# Patient Record
Sex: FEMALE | Race: BLACK OR AFRICAN AMERICAN
[De-identification: names, ages, dates, MRNs, and addresses within clinical notes are randomized per-mention and may not be internally consistent; named-entity substitution may affect disease eponyms.]

---

## 2022-12-13 ENCOUNTER — HOSPITAL ENCOUNTER (INPATIENT)
Dept: HOSPITAL 97 - ER | Age: 22
LOS: 1 days | Discharge: HOME | DRG: 145 | End: 2022-12-14
Attending: OTOLARYNGOLOGY | Admitting: OTOLARYNGOLOGY
Payer: SELF-PAY

## 2022-12-13 VITALS — BODY MASS INDEX: 31.3 KG/M2

## 2022-12-13 DIAGNOSIS — J36: Primary | ICD-10-CM

## 2022-12-13 DIAGNOSIS — Z20.822: ICD-10-CM

## 2022-12-13 LAB
BUN BLD-MCNC: 6 MG/DL (ref 7–18)
GLUCOSE SERPLBLD-MCNC: 108 MG/DL (ref 74–106)
HCT VFR BLD CALC: 38.9 % (ref 36–45)
LYMPHOCYTES # SPEC AUTO: 1.3 K/UL (ref 0.7–4.9)
MCV RBC: 83.7 FL (ref 80–100)
PMV BLD: 8.5 FL (ref 7.6–11.3)
POTASSIUM SERPL-SCNC: 3.2 MMOL/L (ref 3.5–5.1)
RBC # BLD: 4.65 M/UL (ref 3.86–4.86)

## 2022-12-13 PROCEDURE — 99284 EMERGENCY DEPT VISIT MOD MDM: CPT

## 2022-12-13 PROCEDURE — 80048 BASIC METABOLIC PNL TOTAL CA: CPT

## 2022-12-13 PROCEDURE — 96375 TX/PRO/DX INJ NEW DRUG ADDON: CPT

## 2022-12-13 PROCEDURE — 36415 COLL VENOUS BLD VENIPUNCTURE: CPT

## 2022-12-13 PROCEDURE — 87811 SARS-COV-2 COVID19 W/OPTIC: CPT

## 2022-12-13 PROCEDURE — 87040 BLOOD CULTURE FOR BACTERIA: CPT

## 2022-12-13 PROCEDURE — 84703 CHORIONIC GONADOTROPIN ASSAY: CPT

## 2022-12-13 PROCEDURE — 85025 COMPLETE CBC W/AUTO DIFF WBC: CPT

## 2022-12-13 PROCEDURE — 70491 CT SOFT TISSUE NECK W/DYE: CPT

## 2022-12-13 PROCEDURE — 96374 THER/PROPH/DIAG INJ IV PUSH: CPT

## 2022-12-13 RX ADMIN — DEXTROSE AND SODIUM CHLORIDE SCH MLS: 5; .45 INJECTION, SOLUTION INTRAVENOUS at 21:00

## 2022-12-13 RX ADMIN — METHYLPREDNISOLONE SODIUM SUCCINATE SCH MLS: 125 INJECTION, POWDER, FOR SOLUTION INTRAMUSCULAR; INTRAVENOUS at 21:00

## 2022-12-13 NOTE — EDPHYS
Physician Documentation                                                                           

 [facility]                                                                 

Name: Darlyn Lynch                                                                            

Age: 22 yrs                                                                                       

Sex: Female                                                                                       

: 2000                                                                                   

MRN: [id number]                                                                                   

Arrival Date: 2022                                                                          

Time: 16:46                                                                                       

Account#: [id number]                                                                            

Bed 4                                                                                             

Private MD:                                                                                       

ED Physician Albino Beaulieu                                                                         

HPI:                                                                                              

                                                                                             

17:19 This 22 yrs old Black Female presents to ER via Ambulatory with complaints of Sore      snw 

      Throat.                                                                                     

17:19 The patient presents with sore throat. The patient describes throat pain as             snw 

      suffocating, the patient is unable to open their mouth due to pain. Onset: The              

      symptoms/episode began/occurred acutely, 2 week(s) ago, and became persistent. Severity     

      of symptoms: At their worst the symptoms were moderate. Associated signs and symptoms:      

      Pertinent positives: dysphagia, fever, flu-like symptoms, Sore throat. The patient has      

      not experienced similar symptoms in the past. It is unknown whether or not the patient      

      has recently seen a physician.                                                              

                                                                                                  

OB/GYN:                                                                                           

17:13 LMP 2022                                                                          [facility] 

                                                                                                  

Historical:                                                                                       

- Allergies:                                                                                      

17:13 No Known Allergies;                                                                     [facility] 

                                                                                                  

- Immunization history:: Adult Immunizations up to date.                                          

- Social history:: Smoking status: Patient denies any tobacco usage or history of.                

                                                                                                  

                                                                                                  

ROS:                                                                                              

17:18 Constitutional: Negative for fever, chills, and weight loss, Eyes: Negative for injury, snw 

      pain, redness, and discharge, Neck: Negative for injury, pain, and swelling,                

      Cardiovascular: Negative for chest pain, palpitations, and edema, Respiratory: Negative     

      for shortness of breath, cough, wheezing, and pleuritic chest pain, Abdomen/GI:             

      Negative for abdominal pain, nausea, vomiting, diarrhea, and constipation, Back:            

      Negative for injury and pain, : Negative for injury, bleeding, discharge, and             

      swelling, MS/Extremity: Negative for injury and deformity, Skin: Negative for injury,       

      rash, and discoloration, Neuro: Negative for headache, weakness, numbness, tingling,        

      and seizure, Psych: Negative for depression, anxiety, suicide ideation, homicidal           

      ideation, and hallucinations.                                                               

17:18 ENT: Positive for sore throat, diff swallowing x 2 weeks.                                   

                                                                                                  

Exam:                                                                                             

17:17 Constitutional:  This is a well developed, well nourished patient who is awake, alert,  snw 

      and in no acute distress. Head/Face:  Normocephalic, atraumatic. Eyes:  Pupils equal        

      round and reactive to light, extra-ocular motions intact.  Lids and lashes normal.          

      Conjunctiva and sclera are non-icteric and not injected.  Cornea within normal limits.      

      Periorbital areas with no swelling, redness, or edema. Neck:  Trachea midline, no           

      thyromegaly or masses palpated, and no cervical lymphadenopathy.  Supple, full range of     

      motion without nuchal rigidity, or vertebral point tenderness.  No Meningismus.             

      Chest/axilla:  Normal chest wall appearance and motion.  Nontender with no deformity.       

      No lesions are appreciated. Respiratory:  Lungs have equal breath sounds bilaterally,       

      clear to auscultation and percussion.  No rales, rhonchi or wheezes noted.  No              

      increased work of breathing, no retractions or nasal flaring. Abdomen/GI:  Soft,            

      non-tender, with normal bowel sounds.  No distension or tympany.  No guarding or            

      rebound.  No evidence of tenderness throughout. Back:  No spinal tenderness.  No            

      costovertebral tenderness.  Full range of motion. Skin:  Warm, dry with normal turgor.      

      Normal color with no rashes, no lesions, and no evidence of cellulitis. MS/ Extremity:      

      Pulses equal, no cyanosis.  Neurovascular intact.  Full, normal range of motion. Neuro:     

       Awake and alert, GCS 15, oriented to person, place, time, and situation.  Cranial          

      nerves II-XII grossly intact.  Motor strength 5/5 in all extremities.  Sensory grossly      

      intact.  Cerebellar exam normal.  Normal gait. Psych:  Awake, alert, with orientation       

      to person, place and time.  Behavior, mood, and affect are within normal limits.            

17:17 ENT: Nose: is normal, Mouth: is normal, Posterior pharynx: Tonsils: enlarged on the         

      right, with erythema, mild trismus, hot potato speech.                                      

17:17 Cardiovascular: Rate: tachycardic, Rhythm: regular.                                         

                                                                                                  

Vital Signs:                                                                                      

17:07  / 80; Pulse 128; Resp 20; Temp 98.8; Pulse Ox 99% ; Weight 70.31 kg; Height 4    [facility] 

      ft. 11 in. (149.86 cm);                                                                     

18:39 Pulse 108;                                                                              snw 

17:07 Body Mass Index 31.31 (70.31 kg, 149.86 cm)                                             [facility] 

                                                                                                  

MDM:                                                                                              

17:09 Patient medically screened.                                                             snw 

19:40 Data reviewed: vital signs, nurses notes. Data interpreted: Pulse oximetry: on room air snw 

      is 99 %. Interpretation: normal. Counseling: I had a detailed discussion with the           

      patient and/or guardian regarding: the historical points, exam findings, and any            

      diagnostic results supporting the discharge/admit diagnosis, lab results, radiology         

      results. Physician consultation: Olivia Gipson MD was called at 19:41, regarding consult,        

      patient's condition, for I\T\D.                                                             

20:13 Physician consultation: Olivia Gipson MD was contacted at 20:00, in the emergency department snw 

      to see patient at 20:00, Would like to admit to hospital and I\T\D PTA in OR tomorrow.      

                                                                                                  

                                                                                             

17:14 Order name: CBC with Diff; Complete Time: 18:14                                         snw 

                                                                                             

17:14 Order name: Chem 7; Complete Time: 18:14                                                snw 

                                                                                             

17:14 Order name: Blood Culture Adult (2)                                                     snw 

                                                                                             

18:54 Order name: Add On-Lab                                                                  snw 

                                                                                             

19:10 Order name: Pregnancy Test Serum, [employer]; Complete Time: 19:19                        EDMS

                                                                                             

21:10 Order name: SARS RAPID                                                                  ll3 

                                                                                             

17:16 Order name: CT Soft Tissue Neck W/contr; Complete Time: 19:32                           snw 

                                                                                             

21:47 Order name: SARS-COV-2 Antigen Rapid                                                    EDMS

                                                                                                  

Administered Medications:                                                                         

18:14 Drug: NS 0.9% 1000 ml Route: IV; Rate: 1 bolus; Site: left antecubital;                 iw  

18:14 Drug: Decadron - Dexamethasone 10 mg Route: IVP; Site: left antecubital;                iw  

18:14 Drug: Clindamycin 600 mg Route: IVPB; Infused Over: 30 mins; Site: left antecubital;    iw  

18:14 Drug: fentaNYL (PF) 25 mcg Route: IVP; Site: left antecubital;                          iw  

20:25 Drug: Potassium Effervescent Tablet 50 mEq Route: PO;                                   ll3 

                                                                                                  

                                                                                                  

Disposition:                                                                                      

18:46 Co-signature as Attending Physician, Albino GORE was immediately available onsite ms3 

      in the emergency department for consultation in the care of the patient.                    

                                                                                                  

Disposition Summary:                                                                              

22 20:15                                                                                    

Hospitalization Ordered                                                                           

      Hospitalization Status: Observation                                                     snw 

      Provider: Dart, Olivia                                                                     snw 

      Condition: Stable                                                                       snw 

      Problem: new                                                                            snw 

      Symptoms: are unchanged                                                                 snw 

      Bed/Room Type: Standard                                                                 snw 

      Location: [facility] ER HOLD(22 21:19)                                                  cg  

      Room Assignment: ERHOLD-(22 21:19)                                                cg  

      Diagnosis                                                                                   

        - Peritonsillar abscess                                                               snw 

      Forms:                                                                                      

        - Medication Reconciliation Form                                                      snw 

        - SBAR form                                                                           snw 

Signatures:                                                                                       

Dispatcher [employer]                           EDMS                                                 

Marj Orosco, FNP-C                   FNP-Csnw                                                  

Dennise Barr, RN                     RN   iw                                                   

Hiral Aguila RN                       RN   cg                                                   

Albino Beaulieu DO                        DO   ms3                                                  

Mónica Quezada RN                       RN   jh5                                                  

Monik Lange RN                      RN   ll3                                                  

                                                                                                  

Corrections: (The following items were deleted from the chart)                                    

: 20:15 Telemetry/MedSurg (observation) snw                                               cg  

: 20:15 snw                                                                               cg  

                                                                                                  

**************************************************************************************************

## 2022-12-13 NOTE — RAD REPORT
EXAM DESCRIPTION:  CT - Soft Tissue Neck W/Contr

 

CLINICAL HISTORY:  eval PTA

Neck pain and swelling

 

COMPARISON:  No comparisons

 

TECHNIQUE  All CT scans are performed using dose optimization technique as appropriate and may includ
e automated exposure control or mA/KV adjustment according to patient size.

 

FINDINGS:

The right palatine tonsils edematous and enlarged. There is an 18 x 16 mm peritonsillar abscess on th
e right present. Multiple enlarged jugular digastric lymph nodes are present bilaterally on the right
 the largest measuring 18 mm on the left the largest measuring 18-19 mm. Posterior triangle as well a
s supraclavicular nodes are also present.

 

Salivary glands are symmetric. No prevertebral fluid collections evident.

 

IMPRESSION:  18 mm right peritonsillar abscess.

## 2022-12-13 NOTE — ER
Nurse's Notes                                                                                     

 [facility]                                                                 

Name: Darlyn Lynch                                                                            

Age: 22 yrs                                                                                       

Sex: Female                                                                                       

: 2000                                                                                   

MRN: [id number]                                                                                   

Arrival Date: 2022                                                                          

Time: 16:46                                                                                       

Account#: [id number]                                                                            

Bed 4                                                                                             

Private MD:                                                                                       

Diagnosis: Peritonsillar abscess                                                                  

                                                                                                  

Presentation:                                                                                     

                                                                                             

17:07 Chief complaint: Patient states: throat hurts and is swollen; hurts for anything to go  jh5 

      down; i have to use a spit cup. it been like this 2 weeks. Coronavirus screen: Vaccine      

      status: Patient reports receiving the 2nd dose of the covid vaccine. Client denies          

      travel out of the [place] in the last 14 days. Ebola Screen: Patient negative for fever        

      greater than or equal to 101.5 degrees Fahrenheit, and additional compatible Ebola          

      Virus Disease symptoms Patient denies exposure to infectious person. Patient denies         

      travel to an Ebola-affected area in the 21 days before illness onset. Initial Sepsis        

      Screen: Does the patient meet any 2 criteria? No. Patient's initial sepsis screen is        

      negative. Does the patient have a suspected source of infection? No. Patient's initial      

      sepsis screen is negative. Risk Assessment: Do you want to hurt yourself or someone         

      else? Patient reports no desire to harm self or others.                                     

17:07 Method Of Arrival: Ambulatory                                                           [facility] 

17:07 Acuity: NOVA 3                                                                           jh5 

                                                                                                  

Triage Assessment:                                                                                

17:13 General: Appears uncomfortable, Behavior is calm, cooperative. Pain: Complains of pain  jh5 

      in throat.                                                                                  

                                                                                                  

OB/GYN:                                                                                           

17:13 LMP 2022                                                                          [facility] 

                                                                                                  

Historical:                                                                                       

- Allergies:                                                                                      

17:13 No Known Allergies;                                                                     5 

                                                                                                  

- Immunization history:: Adult Immunizations up to date.                                          

- Social history:: Smoking status: Patient denies any tobacco usage or history of.                

                                                                                                  

                                                                                                  

Assessment:                                                                                       

19:05 Reassessment: Patient appears in no apparent distress at this time. Patient and/or      iw  

      family updated on plan of care and expected duration. Pain level reassessed. Patient is     

      alert, oriented x 3, equal unlabored respirations, skin warm/dry/pink.                      

                                                                                                  

Vital Signs:                                                                                      

17:07  / 80; Pulse 128; Resp 20; Temp 98.8; Pulse Ox 99% ; Weight 70.31 kg; Height 4    jh5 

      ft. 11 in. (149.86 cm);                                                                     

18:39 Pulse 108;                                                                              snw 

17:07 Body Mass Index 31.31 (70.31 kg, 149.86 cm)                                             5 

                                                                                                  

ED Course:                                                                                        

16:46 Patient arrived in ED.                                                                  cc5 

16:46 [facility]C [facility]                                                          snw 

16:46 Albino Beaulieu DO is Attending Physician.                                                snw 

17:13 Triage completed.                                                                       5 

17:13 Arm band placed on right wrist.                                                         5 

17:30 First set of blood cultures drawn by me. Missed attempt(s): 20 gauge in left            iw  

      antecubital area. Bleeding controlled, band aid applied, catheter tip intact.               

17:49 Inserted saline lock: 22 gauge in left antecubital area, using aseptic technique.       iw  

18:14 Dennise Barr, RN is Primary Nurse.                                                   iw  

18:56 Inserted saline lock: 22 gauge in right wrist, using aseptic technique. Blood collected.  

19:13 CT Soft Tissue Neck W/contr In Process Unspecified.                                     EDMS

20:14 Olivia Gipson MD is Hospitalizing Provider.                                                snw 

                                                                                                  

Administered Medications:                                                                         

18:14 Drug: NS 0.9% 1000 ml Route: IV; Rate: 1 bolus; Site: left antecubital;                 iw  

18:14 Drug: Decadron - Dexamethasone 10 mg Route: IVP; Site: left antecubital;                iw  

18:14 Drug: Clindamycin 600 mg Route: IVPB; Infused Over: 30 mins; Site: left antecubital;    iw  

18:14 Drug: fentaNYL (PF) 25 mcg Route: IVP; Site: left antecubital;                          iw  

20:25 Drug: Potassium Effervescent Tablet 50 mEq Route: PO;                                   ll3 

                                                                                                  

                                                                                                  

Outcome:                                                                                          

20:15 Decision to Hospitalize by Provider.                                                    snw 

12                                                                                             

11:27 Patient left the ED.                                                                    vg1 

                                                                                                  

Signatures:                                                                                       

Dispatcher [employer]                           EDMS                                                 

Marj Orosco FNP-C                   FNP-Csnw                                                  

Dennise Barr, RN                     SANDEEP                                                      

Nasima Boston RN RN ss Garcia, Victoria, RN RN   vg1                                                  

Mónica Quezada, SANDEEP HOPKINS   5                                                  

Monik Lange RN RN   3                                                  

Yaritza Hernandez                             5                                                  

                                                                                                  

**************************************************************************************************

## 2022-12-14 VITALS — SYSTOLIC BLOOD PRESSURE: 118 MMHG | DIASTOLIC BLOOD PRESSURE: 64 MMHG

## 2022-12-14 VITALS — OXYGEN SATURATION: 98 % | TEMPERATURE: 97.8 F

## 2022-12-14 PROCEDURE — 0C9PXZZ DRAINAGE OF TONSILS, EXTERNAL APPROACH: ICD-10-PCS

## 2022-12-14 RX ADMIN — AMPICILLIN SODIUM AND SULBACTAM SODIUM SCH MLS: 1; .5 INJECTION, POWDER, FOR SOLUTION INTRAMUSCULAR; INTRAVENOUS at 00:38

## 2022-12-14 RX ADMIN — AMPICILLIN SODIUM AND SULBACTAM SODIUM SCH MLS: 1; .5 INJECTION, POWDER, FOR SOLUTION INTRAMUSCULAR; INTRAVENOUS at 12:02

## 2022-12-14 RX ADMIN — METHYLPREDNISOLONE SODIUM SUCCINATE SCH MLS: 125 INJECTION, POWDER, FOR SOLUTION INTRAMUSCULAR; INTRAVENOUS at 10:15

## 2022-12-14 RX ADMIN — AMPICILLIN SODIUM AND SULBACTAM SODIUM SCH MLS: 1; .5 INJECTION, POWDER, FOR SOLUTION INTRAMUSCULAR; INTRAVENOUS at 12:00

## 2022-12-14 RX ADMIN — METHYLPREDNISOLONE SODIUM SUCCINATE SCH MLS: 125 INJECTION, POWDER, FOR SOLUTION INTRAMUSCULAR; INTRAVENOUS at 03:00

## 2022-12-14 RX ADMIN — AMPICILLIN SODIUM AND SULBACTAM SODIUM SCH MLS: 1; .5 INJECTION, POWDER, FOR SOLUTION INTRAMUSCULAR; INTRAVENOUS at 06:00

## 2022-12-14 RX ADMIN — DEXTROSE AND SODIUM CHLORIDE SCH MLS: 5; .45 INJECTION, SOLUTION INTRAVENOUS at 07:00

## 2022-12-14 NOTE — CON
Date of Consultation:  12/13/2022



Chief Complaint:  Severe sore throat.



History Of Present Illness:  The patient is a pleasant 22-year-old black female, who presented to the
 emergency room via a private car with complaints of severe sore throat, which started approximately 
2 weeks ago and became persistent.  The patient developed odynophagia, dysphagia, muffled voice, and 
fever with flu-like symptoms.  She did not take any antibiotics or present to her primary care physic
km for treatment.  She presented to the emergency room for further evaluation and management.  A CT 
scan of soft tissue neck with contrast was performed and it demonstrated a rather large right periton
sillar abscess at least 1.5 cm in diameter.  Thus, these were the indications to consult ENT.  Upon a
rrival to bedside, the patient is in no acute distress.  Her voice is muffled and she still complains
 of moderate-to-severe, 8/10 pain, but it is improved after receiving fentanyl.  She denies vomiting,
 headache, otalgia, otorrhea, rhinorrhea, nasal congestion, shortness of breath, chest pain or airway
 obstruction.  No other ENT complaints today.



Past Medical History:  Denies.



Past Surgical History:  Denies.



Allergies:  NO KNOWN DRUG ALLERGIES.



Social History:  The patient denies tobacco, alcohol, or illicit drugs.



Review of Systems:

Constitutional:  Positive for fatigue and fever. 

Eyes:  Negative for drainage or pain and redness. 

Ears:  Negative for pain or drainage or hearing loss. 

Nose:  Negative for rhinorrhea, nasal congestion, epistaxis. 

Oral Cavity:  Positive for severe sore throat with swelling and odynophagia/dysphagia. 

Respiratory:  Negative for shortness of breath, airway obstruction, cough, wheezing.



Physical Examination:

Vital Signs:  Stable. 

General:  The patient is awake, alert, oriented to person, place, time, and is in no acute distress. 
 She is able to grieve adequately with her mouth closed. 

Head:  Atraumatic, normocephalic. 

Eyes:  PERRLA/EOMI. 

Ears:  Deferred. 

Nose:  Moist intranasal mucosa.  Midline septum.  No bleeding or rhinorrhea. 

Throat:  The patient is able to open the mouth and I was able to insert 2 fingers.  As there were 2 f
ingerbreadths approximately 2 cm of opening, the patient had significant trismus and was unable to op
en the mouth for more than 5 seconds without having significant pain.  The patient has physiologic la
rge base of tongue and examination of the oropharynx demonstrates a deviated uvula to the left due to
 right peritonsillar abscess.  There is fluctuance palpated at the abscess pocket.  Left tonsil appea
rs to be normal size. 

Neck:  Supple.  Trachea midline.



Laboratory Data:  A CT scan of the soft tissue neck was reviewed and I agree with the radiologist's f
indings that there was a large abscess located in the right peritonsillar area and significant mucosa
l swelling surrounding the abscess cavity. 



White blood cell count is 22,000 with significant neutrophil count.



Diagnosis:  Acute right peritonsillar abscess.



Recommendations:  

1.The patient is declining bedside drainage and I agree that the patient is unable to open adequatel
y and she has a large base of tongue, which would be a challenging obstacle during incision and drain
age.

2.Recommend starting Zosyn, Solu-Medrol, p.r.n. Lortab or morphine for pain.

3.Recommend IV fluids if needed.  The patient is able to tolerate liquids so she can have liquids, b
ut n.p.o. after midnight.

4.We will take to the operating room for incision and drainage. 

Thanks for this most interesting consultation.





DESMOND/LINO

DD:  12/14/2022 09:41:19Voice ID:  [id number]

DT:  12/14/2022 10:30:00Report ID:  [id number]